# Patient Record
Sex: FEMALE
[De-identification: names, ages, dates, MRNs, and addresses within clinical notes are randomized per-mention and may not be internally consistent; named-entity substitution may affect disease eponyms.]

---

## 2018-07-21 ENCOUNTER — CHARTING TRANS (OUTPATIENT)
Dept: OTHER | Age: 43
End: 2018-07-21

## 2018-07-23 ENCOUNTER — CHARTING TRANS (OUTPATIENT)
Dept: OTHER | Age: 43
End: 2018-07-23

## 2018-12-21 ENCOUNTER — HOSPITAL ENCOUNTER (EMERGENCY)
Facility: HOSPITAL | Age: 43
Discharge: HOME | End: 2018-12-21
Attending: EMERGENCY MEDICINE

## 2018-12-21 VITALS
OXYGEN SATURATION: 100 % | BODY MASS INDEX: 23 KG/M2 | DIASTOLIC BLOOD PRESSURE: 76 MMHG | RESPIRATION RATE: 18 BRPM | SYSTOLIC BLOOD PRESSURE: 145 MMHG | HEART RATE: 73 BPM | WEIGHT: 125 LBS | HEIGHT: 62 IN | TEMPERATURE: 98 F

## 2018-12-21 DIAGNOSIS — L21.9 SEBORRHEA: Primary | ICD-10-CM

## 2018-12-21 DIAGNOSIS — L25.9 CONTACT DERMATITIS, UNSPECIFIED CONTACT DERMATITIS TYPE, UNSPECIFIED TRIGGER: ICD-10-CM

## 2018-12-21 DIAGNOSIS — L73.9 FOLLICULITIS: ICD-10-CM

## 2018-12-21 PROCEDURE — 99283 EMERGENCY DEPT VISIT LOW MDM: CPT

## 2018-12-21 RX ORDER — DOXYCYCLINE HYCLATE 100 MG/1
100 CAPSULE ORAL 2 TIMES DAILY
Qty: 20 CAPSULE | Refills: 0 | Status: SHIPPED | OUTPATIENT
Start: 2018-12-21 | End: 2018-12-31

## 2018-12-21 RX ORDER — HYDROXYZINE HYDROCHLORIDE 25 MG/1
25 TABLET, FILM COATED ORAL EVERY 6 HOURS PRN
Qty: 20 TABLET | Refills: 0 | Status: SHIPPED | OUTPATIENT
Start: 2018-12-21 | End: 2019-01-10

## 2018-12-22 NOTE — ED INITIAL ASSESSMENT (HPI)
Patient states she was started on terbinafine last week and she started to have red bumps on her body

## 2018-12-22 NOTE — ED PROVIDER NOTES
Patient Seen in: [facility] AND [facility] Emergency Department    History   Patient presents with:   Allergic Rxn Allergies (immune)    Stated Complaint: urticaria     HPI    51-year-old female patient on tube identified for possible scalp fungal infection with No  petechiae. No ecchymosis.   Musculoskeletal: Symmetric, no deformity, no injuries  Neuro: Normal speech, nonfocal examination  Psych: Appropriate, not agitated      ED Course   Labs Reviewed - No data to display             MDM   Encourage patient not

## 2019-07-07 NOTE — LETTER
Date & Time: 12/21/2018, 10:14 PM  Patient: Phoenix Wu  Encounter Provider(s):    Juice Yap MD       To Whom It May Concern:    Maribel Blanco was seen and treated in our department on 12/21/2018.  Please excuse from work on 12/22/18    If you have normal...

## (undated) NOTE — ED AVS SNAPSHOT
Jodi Myers   MRN: [id number]    Department:  [facility] Emergency Department   Date of Visit:  12/21/2018           Disclosure     Insurance plans vary and the physician(s) referred by the ER may not be covered by your plan.  Please contact you CARE PHYSICIAN AT ONCE OR RETURN IMMEDIATELY TO THE EMERGENCY DEPARTMENT. If you have been prescribed any medication(s), please fill your prescription right away and begin taking the medication(s) as directed.   If you believe that any of the medications